# Patient Record
Sex: MALE | Race: WHITE | NOT HISPANIC OR LATINO | Employment: UNEMPLOYED | URBAN - NONMETROPOLITAN AREA
[De-identification: names, ages, dates, MRNs, and addresses within clinical notes are randomized per-mention and may not be internally consistent; named-entity substitution may affect disease eponyms.]

---

## 2020-05-04 ENCOUNTER — OFFICE VISIT (OUTPATIENT)
Dept: PSYCHIATRY | Facility: CLINIC | Age: 20
End: 2020-05-04

## 2020-05-04 DIAGNOSIS — F11.20 OPIOID USE DISORDER, SEVERE, DEPENDENCE (HCC): Primary | ICD-10-CM

## 2020-05-04 DIAGNOSIS — F15.10 METHAMPHETAMINE ABUSE (HCC): ICD-10-CM

## 2020-05-04 PROCEDURE — 99203 OFFICE O/P NEW LOW 30 MIN: CPT | Performed by: NURSE PRACTITIONER

## 2020-05-04 NOTE — PROGRESS NOTES
"    Subjective   Mikey Liu is a 20 y.o. male who presents today for initial evaluation     Chief Complaint:  \"I use heroin and meth\"    History of Present Illness:  Accompanied by:self  This is a 20-year-old male who is currently in rehab at [facility] in [facility].  He tells me that he was court ordered care on March 16 and that he is scheduled to complete the program on June 13.  He has an addiction to meth and heroin and previously was buying Suboxone on the street and it really helped his cravings and he is interested in restarting it.  He tells me that on February 6 he was arrested and was in senior living and he withdrew from heroin and meth in senior living and then came to [facility] on March 16.  He said though last 2 to 3 weeks he has been having worsening cravings for heroin.  He said that he has nightmares at night and that often times during those nightmares he is using.  He said he did not do that for a while and that they have recently restarted.  He is concerned that those cravings will worsen.  Therefore he would like to restart buprenorphine-naloxone.  He said that on the street he would usually use 1/2 to 1 pill a day and then he would not use heroin, but he could not get the Suboxone they have used heroin or meth.  He currently denies any depression or anxiety, and less the cravings, then he feels very anxious.  He said that at night if he is having cravings he has difficulty going to sleep, but if he does not he sleeps fine.  He denies suicidal ideation, homicidal ideation, auditory hallucinations, or visual hallucinations.  He tells me that he has been using meth since age 14 as well as pain pills and then at age 18 he began to use more meth and heroin.  He said that in the past he is also use Xanax and Klonopin.  He said that he had to have Narcan given to him months.  This is the first time he has been in the rehab.      Current Psychiatric Medications:  None    Prior Psychiatric " Medications:  None    Currently in Counseling or Therapy:  In rehab at [facility] in [place]    Prior Psychiatric Outpatient Care:  None    Prior Psychiatric Hospitalizations:  None    Previous Suicide Attempts:  None    Any family history of suicide attempts:  None    Previous Self-Harming Behavior:  None, except taking drugs    Legal History, Arrests, or Incarcerations:  He was court ordered to [facility]     Violent Tendencies:  Denies    Developmental History:  The patient states he was born in [place] and then moved to [facility] with his mother.  His parents  in .  His mother was in [facility] at that time.  He was living with his father but his father  in  of a stroke.  He then was living with his grandparents.  He went to high school through the 12th grade and he hopes to get his GED.    History of Seizures or TBI:  Denies    Highest Level of Education:  12th grade, but did not graduate    Employment:  Unemployed.  He states that he has been on jobs off and on since he was about age 16.     History:  None    Social History:  Prior to coming to [facility] he was living with his mother and [facility].  He states that he now has good relationship with her.  He has 2 older brothers 1 of them is in care home related to drugs and the other brother has never done drugs and he works at [employer].  His maternal grandfather  of alcohol abuse, but the other grandparents are living and he has good relationships with them.  He has never been  and he does not have children.    Last Menstrual Period:      Abuse History:  Verbal: Mother  Emotional: Mother  Mental: Mother  Physical: Mother at times  Sexual: Denies  Rape: Denies  Other: Denies    Substance Abuse History:  Started smoking cigarettes at age 15 and smokes approximately 10 cigarettes a day.    Started using methamphetamine and pain pills, Xanax and Klonopin at age 14.  He only snorts them.    Started heroin at age 18 and continued  meth.  He has never used them IV only snorts them.  He indicates he has not used anything since February 6.  Prior to that he used every day if possible.    But Suboxone on the street starting around age 19.  If he was able to use Suboxone 1/2 to 1 tablet daily he did not use heroin.  He would still use meth at times.    Patient's Support Network Includes:  mother      The following portions of the patient's history were reviewed and updated as appropriate: allergies, current medications, past family history, past medical history, past social history, past surgical history and problem list.      Past Medical History:  No past medical history on file.    Social History:  Social History     Socioeconomic History   • Marital status: Unknown     Spouse name: Not on file   • Number of children: Not on file   • Years of education: Not on file   • Highest education level: Not on file       Family History:  No family history on file.    Past Surgical History:  No past surgical history on file.    Problem List:  There is no problem list on file for this patient.      Allergy:   Allergies not on file     Current Medications:   No current outpatient medications on file.     No current facility-administered medications for this visit.        Review of Symptoms:    Review of Systems      Physical Exam:   There were no vitals taken for this visit. There is no height or weight on file to calculate BMI.     Physical Exam        Mental Status Exam:   Hygiene:   good  Cooperation:  Cooperative  Eye Contact:  Good  Psychomotor Behavior:  Appropriate  Affect:  Appropriate  Mood: normal  Hopelessness: Denies  Speech:  Normal  Thought Process:  Goal directed and Linear  Thought Content:  Normal  Suicidal:  None  Homicidal:  None  Hallucinations:  None  Delusion:  None  Memory:  Intact  Orientation:  Person, Place, Time and Situation  Reliability:  fair  Insight:  Fair  Judgement:  Fair  Impulse Control:  Fair  Physical/Medical Issues:   No        Lab Results:   No visits with results within 1 Month(s) from this visit.   Latest known visit with results is:   No results found for any previous visit.       Assessment/Plan   Diagnoses and all orders for this visit:    Opioid use disorder, severe, dependence (CMS/[facility])    Methamphetamine abuse (CMS/[facility])        Visit Diagnoses:    ICD-10-CM ICD-9-CM   1. Opioid use disorder, severe, dependence (CMS/[facility]) F11.20 304.00   2. Methamphetamine abuse (CMS/[facility]) F15.10 305.70     Staff at [facility] well today and indicated that he did a urine drug screen on this patient today and that is positive for buprenorphine and he has not been prescribed given morphine.  At this time they are going to be discharging him from rehab and I will not be providing buprenorphine for this patient.    GOALS:  Short Term Goals: Patient will be compliant with medication, and patient will have no significant medication related side effects.  Patient will be engaged in psychotherapy as indicated.  Patient will report subjective improvement of symptoms.  Long term goals: To stabilize mood and treat/improve subjective symptoms, the patient will stay out of the hospital, the patient will be at an optimal level of functioning, and the patient will take all medications as prescribed.  The patient verbalized understanding and agreement with goals that were mutually set.      TREATMENT PLAN: Continue supportive psychotherapy efforts and medications as indicated. .  Medication and treatment options, both pharmacological and non-pharmacological treatment options, discussed during today's visit. Patient/Guardian acknowledged and verbally consented with current treatment plan and was educated on the importance of compliance with treatment and follow-up appointments.        MEDICATION ISSUES:    Discussed treatment plan and medication options of prescribed medication as well as the risks, benefits, any black box warnings, and side effects  including potential falls, possible impaired driving, and metabolic adversities among others. Patient is agreeable to call the office with any worsening of symptoms or onset of side effects, or if any concerns or questions arise.  The contact information for the office is made available to the patient. Patient is agreeable to call 911 or go to the nearest ER should they begin having any SI/HI, or if any urgent concerns arise. No medication side effects or related complaints today.     MEDS ORDERED DURING VISIT:  No orders of the defined types were placed in this encounter.      Return in about 1 week (around 5/11/2020) for Recheck.           Functional Status: Severe impairment    Prognosis: Poor, patient is being discharged today from [facility] for a +UDS.            This document has been electronically signed by ARIAN Sage  May 4, 2020 16:45    Please note that portions of this note were completed with a voice recognition program. Efforts were made to edit dictation, but occasionally words are mistranscribed.

## 2024-01-18 ENCOUNTER — HOSPITAL ENCOUNTER (OUTPATIENT)
Dept: GENERAL RADIOLOGY | Facility: HOSPITAL | Age: 24
Discharge: HOME | End: 2024-01-18
Admitting: INTERNAL MEDICINE
Payer: COMMERCIAL

## 2024-01-18 ENCOUNTER — LAB (OUTPATIENT)
Dept: INTERNAL MEDICINE | Facility: CLINIC | Age: 24
End: 2024-01-18
Payer: COMMERCIAL

## 2024-01-18 ENCOUNTER — OFFICE VISIT (OUTPATIENT)
Dept: INTERNAL MEDICINE | Facility: CLINIC | Age: 24
End: 2024-01-18
Payer: COMMERCIAL

## 2024-01-18 VITALS
WEIGHT: 178 LBS | OXYGEN SATURATION: 97 % | DIASTOLIC BLOOD PRESSURE: 78 MMHG | HEART RATE: 68 BPM | SYSTOLIC BLOOD PRESSURE: 130 MMHG | TEMPERATURE: 96.8 F | BODY MASS INDEX: 22.84 KG/M2 | HEIGHT: 74 IN

## 2024-01-18 DIAGNOSIS — E11.65 TYPE 2 DIABETES MELLITUS WITH HYPERGLYCEMIA, UNSPECIFIED WHETHER LONG TERM INSULIN USE: ICD-10-CM

## 2024-01-18 DIAGNOSIS — Z00.00 ANNUAL PHYSICAL EXAM: ICD-10-CM

## 2024-01-18 DIAGNOSIS — R59.1 LYMPHADENOPATHY: ICD-10-CM

## 2024-01-18 DIAGNOSIS — Z00.00 PHYSICAL EXAM: Primary | ICD-10-CM

## 2024-01-18 DIAGNOSIS — R61 NIGHT SWEATS: Primary | ICD-10-CM

## 2024-01-18 DIAGNOSIS — E55.9 VITAMIN D DEFICIENCY: ICD-10-CM

## 2024-01-18 DIAGNOSIS — Z11.4 SCREENING FOR HIV (HUMAN IMMUNODEFICIENCY VIRUS): ICD-10-CM

## 2024-01-18 DIAGNOSIS — Z11.59 NEED FOR HEPATITIS C SCREENING TEST: ICD-10-CM

## 2024-01-18 DIAGNOSIS — Z12.5 SCREENING FOR PROSTATE CANCER: ICD-10-CM

## 2024-01-18 DIAGNOSIS — R61 NIGHT SWEATS: ICD-10-CM

## 2024-01-18 LAB
BILIRUB UR QL STRIP: NEGATIVE
CLARITY UR: CLEAR
COLOR UR: YELLOW
GLUCOSE UR STRIP-MCNC: NEGATIVE MG/DL
HGB UR QL STRIP.AUTO: NEGATIVE
KETONES UR QL STRIP: NEGATIVE
LEUKOCYTE ESTERASE UR QL STRIP.AUTO: NEGATIVE
NITRITE UR QL STRIP: NEGATIVE
PH UR STRIP.AUTO: 6 [PH] (ref 5–8)
PROT UR QL STRIP: NEGATIVE
SP GR UR STRIP: >=1.03 (ref 1–1.03)
UROBILINOGEN UR QL STRIP: NORMAL

## 2024-01-18 PROCEDURE — 80050 GENERAL HEALTH PANEL: CPT | Performed by: INTERNAL MEDICINE

## 2024-01-18 PROCEDURE — 82607 VITAMIN B-12: CPT | Performed by: INTERNAL MEDICINE

## 2024-01-18 PROCEDURE — 81001 URINALYSIS AUTO W/SCOPE: CPT | Performed by: INTERNAL MEDICINE

## 2024-01-18 PROCEDURE — 82306 VITAMIN D 25 HYDROXY: CPT | Performed by: INTERNAL MEDICINE

## 2024-01-18 PROCEDURE — 80061 LIPID PANEL: CPT | Performed by: INTERNAL MEDICINE

## 2024-01-18 PROCEDURE — G0103 PSA SCREENING: HCPCS | Performed by: INTERNAL MEDICINE

## 2024-01-18 PROCEDURE — 83036 HEMOGLOBIN GLYCOSYLATED A1C: CPT | Performed by: INTERNAL MEDICINE

## 2024-01-18 PROCEDURE — 71046 X-RAY EXAM CHEST 2 VIEWS: CPT

## 2024-01-18 PROCEDURE — G0432 EIA HIV-1/HIV-2 SCREEN: HCPCS | Performed by: INTERNAL MEDICINE

## 2024-01-18 PROCEDURE — 86803 HEPATITIS C AB TEST: CPT | Performed by: INTERNAL MEDICINE

## 2024-01-18 NOTE — PROGRESS NOTES
Male Physical Note      Date: 2024   Patient Name: Mikey Liu  : 2000   MRN: [id number]     Chief Complaint:    Chief Complaint   Patient presents with    Mass     Noticed 2 wks ago behind L ear, also has one L side of jaw        History of Present Illness: Mikey Liu is a 24 y.o. male who is here today for their annual health maintenance and physical.  Reports lumps behind left jaw and to left side of jaw.  The one behind jaw has been there around 1 week. The one to middle of left jaw has been present for one year.      Patient reports night sweats for around 1 mo.  Not every night but does have to change clothes when it happens.  Happens 2-3x week.  Has had unintentional weight loss of 15 lbs over 2 months.  No family hx of leukemia/lymphoma.        Subjective      Review of Systems     I have reviewed the following portions of the patient's history and these were updated and discussed with the patient as appropriate: past family history, past medical history, past social history, past surgical history, and problem list.      Medications:   No current outpatient medications on file.    Allergies:   No Known Allergies    Immunizations:  Immunization History   Administered Date(s) Administered    DTaP, Unspecified 2004    Flu Vaccine Intradermal Quad 18-64YR 2007, 2008, 2009, 2014    Hep A, 2 Dose 2009, 2010    Hepatitis A 2018    IPV 2004    Influenza LAIV (Nasal) 10/14/2008, 10/21/2013    MMR 2004    Meningococcal MCV4P (Menactra) 2011, 2016    Tdap 2011    Varicella 2009      Colorectal Screening:     Last Completed Colonoscopy       This patient has no relevant Health Maintenance data.          CT for Smoker (Age 55-75, 30pk yr):   US Aorta (For male smokers, age 65):   PSA(Over age 50):   Prostate exam: 40 (if family hx) 45 otherwise:   Hep C ( 3300-9705): get today     HIV get today    Diet/Physical  activity: avg, avg    Sexual health: active    Sexual Health Inventory for Men (SHANTAL)   Over the past 6 months:     1. How do you rate your confidence that you could get and keep an erection?  5 - Very high    2. When you had erections with sexual     stimulation, how often were your erections hard enough for penetration (entering your partner)?  5 - Almost always or always    3. During sexual intercourse, how often were you able to maintain your erection after you had penetrated (entered) your partner?  5 - Almost always or always    4. During sexual intercourse, how difficult was it to maintain your erection to completion of intercourse?  5 - Not difficult    5. When you attempted sexual intercourse, how often was it satisfactory for you?  5 - Almost always or always     Total Score: 25   The Sexual Health Inventory for Men further classifies ED severity with the following breakpoints:   1-7 (Severe ED) 8-11 (Moderate ED) 12-16 (Mild to Moderate ED) 17-21 (Mild ED)        PHQ-9 Depression Screening  Little interest or pleasure in doing things? 0-->not at all   Feeling down, depressed, or hopeless? 0-->not at all   Trouble falling or staying asleep, or sleeping too much?     Feeling tired or having little energy?     Poor appetite or overeating?     Feeling bad about yourself - or that you are a failure or have let yourself or your family down?     Trouble concentrating on things, such as reading the newspaper or watching television?     Moving or speaking so slowly that other people could have noticed? Or the opposite - being so fidgety or restless that you have been moving around a lot more than usual?     Thoughts that you would be better off dead, or of hurting yourself in some way?     PHQ-9 Total Score 0   If you checked off any problems, how difficult have these problems made it for you to do your work, take care of things at home, or get along with other people?       KARLA-7        Objective     Physical  "Exam:  Vital Signs:   Vitals:    01/18/24 1023   BP: 130/78   BP Location: Left arm   Patient Position: Sitting   Cuff Size: Adult   Pulse: 68   Temp: 96.8 °F (36 °C)   TempSrc: Tympanic   SpO2: 97%   Weight: 80.7 kg (178 lb)   Height: 188 cm (74\")     Body mass index is 22.85 kg/m².     Physical Exam  Constitutional:       General: He is not in acute distress.     Appearance: Normal appearance. He is not ill-appearing.   HENT:      Right Ear: Tympanic membrane normal.      Left Ear: Tympanic membrane and ear canal normal.      Nose: No congestion or rhinorrhea.      Mouth/Throat:      Mouth: Mucous membranes are moist.      Pharynx: No oropharyngeal exudate.   Eyes:      Extraocular Movements: Extraocular movements intact.      Conjunctiva/sclera: Conjunctivae normal.      Pupils: Pupils are equal, round, and reactive to light.   Cardiovascular:      Rate and Rhythm: Normal rate and regular rhythm.      Heart sounds: No murmur heard.  Pulmonary:      Effort: Pulmonary effort is normal. No respiratory distress.   Abdominal:      General: Abdomen is flat. Bowel sounds are normal.      Palpations: Abdomen is soft.      Tenderness: There is no abdominal tenderness.   Musculoskeletal:      Right lower leg: No edema.      Left lower leg: No edema.   Lymphadenopathy:      Cervical: Cervical adenopathy present.   Skin:     General: Skin is warm and dry.      Findings: No rash.   Neurological:      General: No focal deficit present.      Mental Status: He is alert and oriented to person, place, and time. Mental status is at baseline.   Psychiatric:         Mood and Affect: Mood normal.         Behavior: Behavior normal.         Procedures    LABS: No results found for: \"HGBA1C\"  No results found for: \"MICROALBUR\", \"[id number]\"     Assessment / Plan      Assessment/Plan:   Diagnoses and all orders for this visit:    1. Night sweats (Primary)  -     Cancel: CT soft tissue neck w contrast; Future  -     XR Chest PA & Lateral; " Future  -     CT Abdomen Pelvis With Contrast  -     CT soft tissue neck w contrast; Future  -     Ambulatory Referral to Oncology    2. Lymphadenopathy  -     Cancel: CT soft tissue neck w contrast; Future  -     CT Abdomen Pelvis With Contrast  -     CT soft tissue neck w contrast; Future  -     Ambulatory Referral to Oncology    3. Type 2 diabetes mellitus with hyperglycemia, unspecified whether long term insulin use    4. Screening for prostate cancer  -     PSA Screen    5. Annual physical exam  -     Hemoglobin A1c  -     Vitamin B12  -     Lipid Panel  -     Comprehensive Metabolic Panel  -     CBC & Differential  -     Urinalysis With Microscopic - Urine, Clean Catch  -     TSH Rfx On Abnormal To Free T4    6. Vitamin D deficiency  -     Vitamin D,25-Hydroxy    7. Need for hepatitis C screening test  -     Hepatitis C Antibody    8. Screening for HIV (human immunodeficiency virus)  -     HIV-1 / O / 2 Ag / Antibody         BMI is within normal parameters. No other follow-up for BMI required.       Follow Up:   No follow-ups on file.    Healthcare Maintenance:   Counseling provided on exercise, nutrition, age-appropriate screening test, and appropriate lab studies.   Mikey Liu voices understanding and acceptance of this advice and will call back with any further questions or concerns. AVS with preventive healthcare tips printed for patient.     Reviewed the following with the patient: encouraged patient to exercise 5-7 days per week for 30 minutes at a time.      Hermilo Miranda DO   [facility]

## 2024-01-19 LAB
25(OH)D3 SERPL-MCNC: 20.2 NG/ML (ref 30–100)
ALBUMIN SERPL-MCNC: 4.8 G/DL (ref 3.5–5.2)
ALBUMIN/GLOB SERPL: 1.7 G/DL
ALP SERPL-CCNC: 40 U/L (ref 39–117)
ALT SERPL W P-5'-P-CCNC: 13 U/L (ref 1–41)
ANION GAP SERPL CALCULATED.3IONS-SCNC: 12.2 MMOL/L (ref 5–15)
AST SERPL-CCNC: 18 U/L (ref 1–40)
BACTERIA UR QL AUTO: NORMAL /HPF
BASOPHILS # BLD AUTO: 0.1 10*3/MM3 (ref 0–0.2)
BASOPHILS NFR BLD AUTO: 2.3 % (ref 0–1.5)
BILIRUB SERPL-MCNC: 0.6 MG/DL (ref 0–1.2)
BUN SERPL-MCNC: 15 MG/DL (ref 6–20)
BUN/CREAT SERPL: 14 (ref 7–25)
CALCIUM SPEC-SCNC: 9.7 MG/DL (ref 8.6–10.5)
CHLORIDE SERPL-SCNC: 104 MMOL/L (ref 98–107)
CHOLEST SERPL-MCNC: 166 MG/DL (ref 0–200)
CO2 SERPL-SCNC: 23.8 MMOL/L (ref 22–29)
CREAT SERPL-MCNC: 1.07 MG/DL (ref 0.76–1.27)
DEPRECATED RDW RBC AUTO: 38.1 FL (ref 37–54)
EGFRCR SERPLBLD CKD-EPI 2021: 99.4 ML/MIN/1.73
EOSINOPHIL # BLD AUTO: 0.24 10*3/MM3 (ref 0–0.4)
EOSINOPHIL NFR BLD AUTO: 5.6 % (ref 0.3–6.2)
ERYTHROCYTE [DISTWIDTH] IN BLOOD BY AUTOMATED COUNT: 11.7 % (ref 12.3–15.4)
GLOBULIN UR ELPH-MCNC: 2.9 GM/DL
GLUCOSE SERPL-MCNC: 86 MG/DL (ref 65–99)
HBA1C MFR BLD: 5.6 % (ref 4.8–5.6)
HCT VFR BLD AUTO: 43.1 % (ref 37.5–51)
HCV AB SER DONR QL: NORMAL
HDLC SERPL-MCNC: 49 MG/DL (ref 40–60)
HGB BLD-MCNC: 14.9 G/DL (ref 13–17.7)
HIV 1+2 AB+HIV1 P24 AG SERPL QL IA: NORMAL
HYALINE CASTS UR QL AUTO: NORMAL /LPF
IMM GRANULOCYTES # BLD AUTO: 0.01 10*3/MM3 (ref 0–0.05)
IMM GRANULOCYTES NFR BLD AUTO: 0.2 % (ref 0–0.5)
LDLC SERPL CALC-MCNC: 106 MG/DL (ref 0–100)
LDLC/HDLC SERPL: 2.18 {RATIO}
LYMPHOCYTES # BLD AUTO: 1.42 10*3/MM3 (ref 0.7–3.1)
LYMPHOCYTES NFR BLD AUTO: 33 % (ref 19.6–45.3)
MCH RBC QN AUTO: 30.9 PG (ref 26.6–33)
MCHC RBC AUTO-ENTMCNC: 34.6 G/DL (ref 31.5–35.7)
MCV RBC AUTO: 89.4 FL (ref 79–97)
MONOCYTES # BLD AUTO: 0.49 10*3/MM3 (ref 0.1–0.9)
MONOCYTES NFR BLD AUTO: 11.4 % (ref 5–12)
NEUTROPHILS NFR BLD AUTO: 2.04 10*3/MM3 (ref 1.7–7)
NEUTROPHILS NFR BLD AUTO: 47.5 % (ref 42.7–76)
NRBC BLD AUTO-RTO: 0 /100 WBC (ref 0–0.2)
PLATELET # BLD AUTO: 196 10*3/MM3 (ref 140–450)
PMV BLD AUTO: 11.5 FL (ref 6–12)
POTASSIUM SERPL-SCNC: 4.8 MMOL/L (ref 3.5–5.2)
PROT SERPL-MCNC: 7.7 G/DL (ref 6–8.5)
PSA SERPL-MCNC: 0.54 NG/ML (ref 0–4)
RBC # BLD AUTO: 4.82 10*6/MM3 (ref 4.14–5.8)
RBC # UR STRIP: NORMAL /HPF
REF LAB TEST METHOD: NORMAL
SODIUM SERPL-SCNC: 140 MMOL/L (ref 136–145)
SQUAMOUS #/AREA URNS HPF: NORMAL /HPF
TRIGL SERPL-MCNC: 52 MG/DL (ref 0–150)
TSH SERPL DL<=0.05 MIU/L-ACNC: 0.81 UIU/ML (ref 0.27–4.2)
VIT B12 BLD-MCNC: 696 PG/ML (ref 211–946)
VLDLC SERPL-MCNC: 11 MG/DL (ref 5–40)
WBC # UR STRIP: NORMAL /HPF
WBC NRBC COR # BLD AUTO: 4.3 10*3/MM3 (ref 3.4–10.8)

## 2024-01-22 ENCOUNTER — PATIENT ROUNDING (BHMG ONLY) (OUTPATIENT)
Dept: INTERNAL MEDICINE | Facility: CLINIC | Age: 24
End: 2024-01-22
Payer: COMMERCIAL

## 2024-01-30 ENCOUNTER — HOSPITAL ENCOUNTER (OUTPATIENT)
Dept: CT IMAGING | Facility: HOSPITAL | Age: 24
Discharge: HOME | End: 2024-01-30
Admitting: INTERNAL MEDICINE
Payer: COMMERCIAL

## 2024-01-30 DIAGNOSIS — R61 NIGHT SWEATS: ICD-10-CM

## 2024-01-30 DIAGNOSIS — R59.1 LYMPHADENOPATHY: ICD-10-CM

## 2024-01-30 PROCEDURE — 25510000001 IOPAMIDOL 61 % SOLUTION: Performed by: INTERNAL MEDICINE

## 2024-01-30 PROCEDURE — 70491 CT SOFT TISSUE NECK W/DYE: CPT

## 2024-01-30 PROCEDURE — 74177 CT ABD & PELVIS W/CONTRAST: CPT

## 2024-01-30 RX ADMIN — IOPAMIDOL 85 ML: 612 INJECTION, SOLUTION INTRAVENOUS at 10:25

## 2024-02-01 ENCOUNTER — TELEPHONE (OUTPATIENT)
Dept: INTERNAL MEDICINE | Facility: CLINIC | Age: 24
End: 2024-02-01
Payer: COMMERCIAL

## 2024-02-01 NOTE — TELEPHONE ENCOUNTER
" ONCOLOGY STATED \" Note from Dr. Drake  There is nothing for us here.  If anything needs Infectious disease \" PLEASE ADVISE  "

## 2024-02-05 DIAGNOSIS — R61 NIGHT SWEATS: Primary | ICD-10-CM
